# Patient Record
(demographics unavailable — no encounter records)

---

## 2025-02-25 NOTE — HEALTH RISK ASSESSMENT
[Good] : ~his/her~ current health as good [Fair] :  ~his/her~ mood as fair [No] : In the past 12 months have you used drugs other than those required for medical reasons? No [No falls in past year] : Patient reported no falls in the past year [Little interest or pleasure doing things] : 1) Little interest or pleasure doing things [Feeling down, depressed, or hopeless] : 2) Feeling down, depressed, or hopeless [0] : 2) Feeling down, depressed, or hopeless: Not at all (0) [PHQ-2 Negative - No further assessment needed] : PHQ-2 Negative - No further assessment needed [Patient reported mammogram was normal] : Patient reported mammogram was normal [Patient reported PAP Smear was normal] : Patient reported PAP Smear was normal [HIV test declined] : HIV test declined [Hepatitis C test declined] : Hepatitis C test declined [None] : None [Alone] : lives alone [Employed] : employed [College] : College [Single] : single [# Of Children ___] : has [unfilled] children [Feels Safe at Home] : Feels safe at home [Fully functional (bathing, dressing, toileting, transferring, walking, feeding)] : Fully functional (bathing, dressing, toileting, transferring, walking, feeding) [Fully functional (using the telephone, shopping, preparing meals, housekeeping, doing laundry, using] : Fully functional and needs no help or supervision to perform IADLs (using the telephone, shopping, preparing meals, housekeeping, doing laundry, using transportation, managing medications and managing finances) [Smoke Detector] : smoke detector [Carbon Monoxide Detector] : carbon monoxide detector [Safety elements used in home] : safety elements used in home [Seat Belt] :  uses seat belt [With Patient/Caregiver] : , with patient/caregiver [Designated Healthcare Proxy] : Designated healthcare proxy [Name: ___] : Health Care Proxy's Name: [unfilled]  [Relationship: ___] : Relationship: [unfilled] [Aggressive treatment] : aggressive treatment [I will adhere to the patient's wishes.] : I will adhere to the patient's wishes. [Time Spent: ___ minutes] : Time Spent: [unfilled] minutes [Never] : Never

## 2025-03-25 NOTE — PHYSICAL EXAM
[TextEntry] : Constitutional: Well developed, well appearing, not in acute distress Head: Normocephalic, no lesions Chest: Lungs are clear, no rales, no rhonchi, no wheezing Heart: Regular rate, no murmurs Abdomen: Soft, no tenderness : No CVAT Neuro: AAO x 3, no focal neurological deficit.

## 2025-03-25 NOTE — HISTORY OF PRESENT ILLNESS
[FreeTextEntry1] : F/u exam [de-identified] : Ms. GLORIA WILLIS 70 year female with a PMH HTN, Hypothyroidism, hyperlipidemia,  vitamin D deficiency depression present to the office for a f/u on a BP reading. Also came to f/u on a blood test from 02/25/25. Patient feels OK, denies complaints. As per patient she feels better after  starting medication levoxyl 25mcg

## 2025-03-25 NOTE — PLAN
[FreeTextEntry1] : Ms. GLORIA WILLIS 70 year female with a PMH HTN, Hypothyroidism, hyperlipidemia,  vitamin D deficiency depression present to the office for a f/u on a BP reading. HTN - BP is elevated recommend to continue losartan 50mg add 12.5mg HCTZ. Recommend  low salt diet, decrease caffeine intake. Monitor BP reading at home. Result of the blood test discussed with the patient in detail.  Patient has Hypothyroidism - continue levoxyl 25mcg, do a thyroid u/s Has low vitamin D, recommend to start taking vitamin D 82308O qweekly for 3 mo, then take vit D3 2000U daily. 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 Recommend to avoid kissing, sharing cups during outbreaks RTC in 1 mo for a f/u

## 2025-04-30 NOTE — DISCUSSION/SUMMARY
[FreeTextEntry1] : ECG with [facility], suggestive of LVH. Will arrange for transthoracic echo to ensure normal left ventricular size and function and normal valvular function.   HTN: BP not optimally controlled and patient is having symptoms of fatigue possibly related to losartan use.  (She did not feel that prior to taking). Has not yet started HCTZ. Normal CMP.  Will DC losartan.  Hold on hydrochlorothiazide.  Will start amlodipine 5 mg and monitor BP, symptoms.  HLD: Lipids mildly elevated, . Aggressive lifestyle modifications encouraged.  Consider CAC score in the future  RV 3M

## 2025-04-30 NOTE — HISTORY OF PRESENT ILLNESS
[FreeTextEntry1] : 70F with HTN, HLD presents for initial cardiac evaluation   Feeling generally OK, suffers from depression  Denies chest pain, shortness of breath, palpitations, dizziness, lightheadedness, syncope. Currently on losartan, HCTZ recently added for better BP management, pt has not yet started  Concerned her BP medications are causing excess fatigue - did not start feeling until starting the losartan  PCP noted abnormal ECG at last visit, referred out to cards  Never smoker. Father - HTN. Employed at [employer].   ECG: SR with [facility], LVH  Start amlodipine 5mg daily  Losartan 50mg - dc HCTZ 12.5mg - has not yet started - hold

## 2025-07-30 NOTE — HISTORY OF PRESENT ILLNESS
[FreeTextEntry1] : 70F with HTN, HLD presents for follow up eval, BP management  Losartan d/c'ed last visit due to fatigue Amlodipine 5mg started instead, HCTZ on hold Still with some fatigue, better than before    Feeling generally OK otherwise  Denies chest pain, shortness of breath, palpitations Occasional dizziness when stands too quickly   Never smoker. Father - HTN. Employed at [employer].   ECG: SR with [facility], LVH TTE 2025:  1. Left ventricular cavity is normal in size. Left ventricular wall thickness is normal. Left ventricular systolic function is normal with an ejection fraction of 65 % by Rios's method of disks. There are no regional wall motion abnormalities seen. 2. Normal left ventricular diastolic function, with normal left ventricular filling pressure. 3. Normal right ventricular cavity size, with normal wall thickness, and normal right ventricular systolic function. Tricuspid annular plane systolic excursion (TAPSE) is 2.2 cm (normal >=1.7 cm). 4. Left atrium is normal in size. 5. Mild mitral regurgitation.  Amlodipine 5mg daily

## 2025-07-30 NOTE — DISCUSSION/SUMMARY
[FreeTextEntry1] : HTN: Better on repeat, borderline. Now on amlodipine 5mg. Off losartan, HCTZ on hold. Would continue with current regimen, trend closely.  HLD: Lipids mildly elevated, . Aggressive lifestyle modifications encouraged.  Consider CAC score in the future  RV 6M